# Patient Record
Sex: FEMALE | Race: WHITE | NOT HISPANIC OR LATINO | Employment: FULL TIME | URBAN - METROPOLITAN AREA
[De-identification: names, ages, dates, MRNs, and addresses within clinical notes are randomized per-mention and may not be internally consistent; named-entity substitution may affect disease eponyms.]

---

## 2017-01-27 ENCOUNTER — ALLSCRIPTS OFFICE VISIT (OUTPATIENT)
Dept: OTHER | Facility: OTHER | Age: 47
End: 2017-01-27

## 2017-01-29 DIAGNOSIS — Z12.31 ENCOUNTER FOR SCREENING MAMMOGRAM FOR MALIGNANT NEOPLASM OF BREAST: ICD-10-CM

## 2017-02-03 ENCOUNTER — GENERIC CONVERSION - ENCOUNTER (OUTPATIENT)
Dept: OTHER | Facility: OTHER | Age: 47
End: 2017-02-03

## 2018-01-13 NOTE — RESULT NOTES
Message   Recorded as Task   Date: 02/02/2016 08:22 AM, Created By: July Byers   Task Name: Call Back   Assigned To: Germain Campos   Regarding Patient: Nevaeh Renee, Status: In Progress   CommentJake Limaing - 02 Feb 2016 8:22 AM     TASK CREATED  Caller: Self; Results Inquiry; [phone] (Mobile Phone)  Pt is calling to get the results of her last pap and culture  Please Advise     Mirian Song - 02 Feb 2016 8:52 AM     TASK IN PROGRESS   Mirian Song - 02 Feb 2016 8:58 AM     TASK EDITED  pt inf pap and culture wnl        Signatures   Electronically signed by : Simón Dubose, ; Feb 2 2016  8:58AM EST                       (Author)

## 2018-01-14 VITALS
HEIGHT: 67 IN | BODY MASS INDEX: 35.16 KG/M2 | DIASTOLIC BLOOD PRESSURE: 78 MMHG | WEIGHT: 224 LBS | SYSTOLIC BLOOD PRESSURE: 120 MMHG

## 2018-02-02 ENCOUNTER — OFFICE VISIT (OUTPATIENT)
Dept: OBGYN CLINIC | Facility: CLINIC | Age: 48
End: 2018-02-02
Payer: COMMERCIAL

## 2018-02-02 VITALS
SYSTOLIC BLOOD PRESSURE: 120 MMHG | DIASTOLIC BLOOD PRESSURE: 76 MMHG | WEIGHT: 237 LBS | BODY MASS INDEX: 38.09 KG/M2 | HEIGHT: 66 IN

## 2018-02-02 DIAGNOSIS — Z01.419 ENCNTR FOR GYN EXAM (GENERAL) (ROUTINE) W/O ABN FINDINGS: Primary | ICD-10-CM

## 2018-02-02 DIAGNOSIS — Z12.31 ENCOUNTER FOR SCREENING MAMMOGRAM FOR MALIGNANT NEOPLASM OF BREAST: ICD-10-CM

## 2018-02-02 PROCEDURE — 99396 PREV VISIT EST AGE 40-64: CPT | Performed by: PHYSICIAN ASSISTANT

## 2018-02-02 RX ORDER — CLONAZEPAM 2 MG/1
TABLET ORAL
COMMUNITY

## 2018-02-02 NOTE — PROGRESS NOTES
Augusto Alexmilad  1970      CC:  Yearly exam    S:  41 y o  female here for yearly exam  Her cycles are regular, not heavy or crampy  She is sexually active  She uses vasectomy for contraception  Her periods are slightly heavier this year than in the past; she used to use only pantiliners for her periods and she now needs to use a regular pad but still does not saturate that in a few hours  We discussed trying Aleve two po BID if this bothers her  Last Pap 1/2/15 neg  Last HPV 1/2/15 neg  Last Mammo 2/3/17 neg      Current Outpatient Prescriptions:     clonazePAM (KLONOPIN) 2 mg tablet, Take by mouth, Disp: , Rfl:   Social History     Social History    Marital status: /Civil Union     Spouse name: N/A    Number of children: N/A    Years of education: N/A     Occupational History    Not on file  Social History Main Topics    Smoking status: Former Smoker    Smokeless tobacco: Never Used    Alcohol use Yes      Comment: social drinker    Drug use: No    Sexual activity: Yes     Birth control/ protection: Male Sterilization     Other Topics Concern    Not on file     Social History Narrative    Daily coffee consumption ( 2 cups/day)         Family History   Problem Relation Age of Onset    Hypertension Father     Skin cancer Father     Diabetes Father     Rashes / Skin problems Father     Heart disease Maternal Grandmother     Heart failure Maternal Grandmother     Stroke Maternal Grandmother     Heart disease Maternal Grandfather     Heart failure Maternal Grandfather     No Known Problems Mother     Rashes / Skin problems Brother     No Known Problems Paternal Grandmother     Heart failure Paternal Grandfather       Past Medical History:   Diagnosis Date    Candidiasis     resolved: 1/2/15        O:  Blood pressure 120/76, height 5' 6 14" (1 68 m), weight 108 kg (237 lb), last menstrual period 01/24/2018      Patient appears well and is not in distress  Neck is supple without masses  Breasts are symmetrical without mass, tenderness, nipple discharge, skin changes or adenopathy  Abdomen is soft and nontender without masses  External genitals are normal without lesions or rashes  Vagina is normal without discharge or bleeding  Cervix is normal without discharge or lesion  Uterus is normal, mobile, nontender without palpable mass  Top normal   Adnexa are normal, nontender, without palpable mass  A:  Yearly exam      P:  RTO one year for yearly exam or sooner as needed

## 2018-02-02 NOTE — PROGRESS NOTES
I have reviewed the notes, assessments, and/or procedures performed by Viri Zhao, I concur with her/his documentation of Micky Christopher

## 2018-10-02 DIAGNOSIS — B37.9 YEAST INFECTION: Primary | ICD-10-CM

## 2018-10-02 NOTE — TELEPHONE ENCOUNTER
Spoke with pt - symptoms started few days ago  She has itching and burning when urine touches skin  No discharge or odor  Feels raw and irritated  She tied Monistat 1 yesterday - did not help with her symptoms  She would like an Rx of Diflucan  OK to order? Please advise  Thanks!

## 2018-10-03 RX ORDER — FLUCONAZOLE 150 MG/1
TABLET ORAL
Qty: 2 TABLET | Refills: 0 | Status: SHIPPED | OUTPATIENT
Start: 2018-10-03 | End: 2018-10-06

## 2019-02-08 ENCOUNTER — ANNUAL EXAM (OUTPATIENT)
Dept: OBGYN CLINIC | Facility: CLINIC | Age: 49
End: 2019-02-08
Payer: COMMERCIAL

## 2019-02-08 VITALS
HEIGHT: 66 IN | BODY MASS INDEX: 33.43 KG/M2 | SYSTOLIC BLOOD PRESSURE: 108 MMHG | WEIGHT: 208 LBS | DIASTOLIC BLOOD PRESSURE: 74 MMHG

## 2019-02-08 DIAGNOSIS — B36.9 FUNGAL DERMATITIS: ICD-10-CM

## 2019-02-08 DIAGNOSIS — N92.0 MENORRHAGIA WITH REGULAR CYCLE: Primary | ICD-10-CM

## 2019-02-08 DIAGNOSIS — Z12.31 ENCOUNTER FOR SCREENING MAMMOGRAM FOR MALIGNANT NEOPLASM OF BREAST: ICD-10-CM

## 2019-02-08 DIAGNOSIS — Z01.419 ENCNTR FOR GYN EXAM (GENERAL) (ROUTINE) W/O ABN FINDINGS: ICD-10-CM

## 2019-02-08 PROCEDURE — 99396 PREV VISIT EST AGE 40-64: CPT | Performed by: PHYSICIAN ASSISTANT

## 2019-02-08 RX ORDER — NYSTATIN AND TRIAMCINOLONE ACETONIDE 100000; 1 [USP'U]/G; MG/G
OINTMENT TOPICAL 2 TIMES DAILY
Qty: 30 G | Refills: 0 | Status: SHIPPED | OUTPATIENT
Start: 2019-02-08

## 2019-02-08 RX ORDER — TRANEXAMIC ACID 650 1/1
2 TABLET ORAL 3 TIMES DAILY PRN
Qty: 30 TABLET | Refills: 5 | Status: SHIPPED | OUTPATIENT
Start: 2019-02-08 | End: 2019-02-13

## 2019-02-08 NOTE — PROGRESS NOTES
Keenan Sanford  1970      CC:  Yearly exam    S:  57 y o  female here for yearly exam  Her cycles are regular, still heavier than she would prefer, at heaviest soaking a regular pad in 4 hours  She is sexually active  She uses vasectomy for contraception  She notes an intermittently itchy and painful spot under her right breast for the past 6-8 months  She tried a few days of Lotrimin without change  Last Pap 1/2/15 neg/neg  Last Mammo 2/9/18 neg    Current Outpatient Prescriptions:     clonazePAM (KLONOPIN) 2 mg tablet, Take by mouth, Disp: , Rfl:   Social History     Social History    Marital status: /Civil Union     Spouse name: N/A    Number of children: N/A    Years of education: N/A     Occupational History    Not on file  Social History Main Topics    Smoking status: Former Smoker    Smokeless tobacco: Never Used    Alcohol use Yes      Comment: social drinker    Drug use: No    Sexual activity: Yes     Birth control/ protection: Male Sterilization     Other Topics Concern    Not on file     Social History Narrative    Daily coffee consumption ( 2 cups/day)         Family History   Problem Relation Age of Onset    Hypertension Father     Skin cancer Father     Diabetes Father     Rashes / Skin problems Father     Heart disease Maternal Grandmother     Heart failure Maternal Grandmother     Stroke Maternal Grandmother     Heart disease Maternal Grandfather     Heart failure Maternal Grandfather     No Known Problems Mother     Rashes / Skin problems Brother     No Known Problems Paternal Grandmother     Heart failure Paternal Grandfather       Past Medical History:   Diagnosis Date    Candidiasis     resolved: 1/2/15        O:  Blood pressure 108/74, height 5' 6 14" (1 68 m), weight 94 3 kg (208 lb), last menstrual period 01/18/2019      Patient appears well and is not in distress  Neck is supple without masses  Breasts are symmetrical without mass, tenderness, nipple discharge, skin changes or adenopathy  Small area of what appears to be fungal dermatitis under the right breast - erythematous oval 1x2cm in size  Abdomen is soft and nontender without masses  External genitals are normal without lesions or rashes  Vagina is normal without discharge or bleeding  Cervix is normal without discharge or lesion  Uterus is normal, mobile, nontender without palpable mass  Adnexa are normal, nontender, without palpable mass  A:  Yearly exam  Fungal dermatitis  Menorrhagia  P:   Pap due 2020   Mammo slip provided    [facility] ointment sent to [facility]     RTO one year for yearly exam or sooner as needed

## 2022-01-07 ENCOUNTER — ANNUAL EXAM (OUTPATIENT)
Dept: OBGYN CLINIC | Facility: CLINIC | Age: 52
End: 2022-01-07

## 2022-01-07 VITALS
HEIGHT: 67 IN | SYSTOLIC BLOOD PRESSURE: 122 MMHG | DIASTOLIC BLOOD PRESSURE: 80 MMHG | BODY MASS INDEX: 40.93 KG/M2 | WEIGHT: 260.8 LBS

## 2022-01-07 DIAGNOSIS — Z12.11 SCREENING FOR COLON CANCER: ICD-10-CM

## 2022-01-07 DIAGNOSIS — Z01.419 WOMEN'S ANNUAL ROUTINE GYNECOLOGICAL EXAMINATION: Primary | ICD-10-CM

## 2022-01-07 DIAGNOSIS — B36.9 FUNGAL DERMATITIS: ICD-10-CM

## 2022-01-07 DIAGNOSIS — Z12.31 ENCOUNTER FOR SCREENING MAMMOGRAM FOR MALIGNANT NEOPLASM OF BREAST: ICD-10-CM

## 2022-01-07 PROBLEM — Z79.899 CONTROLLED SUBSTANCE AGREEMENT SIGNED: Status: ACTIVE | Noted: 2021-12-03

## 2022-01-07 PROCEDURE — G0476 HPV COMBO ASSAY CA SCREEN: HCPCS | Performed by: PHYSICIAN ASSISTANT

## 2022-01-07 PROCEDURE — G0145 SCR C/V CYTO,THINLAYER,RESCR: HCPCS | Performed by: PHYSICIAN ASSISTANT

## 2022-01-07 PROCEDURE — 99396 PREV VISIT EST AGE 40-64: CPT | Performed by: PHYSICIAN ASSISTANT

## 2022-01-07 RX ORDER — ALPRAZOLAM 0.25 MG/1
TABLET ORAL
COMMUNITY
Start: 2021-12-03

## 2022-01-07 NOTE — PROGRESS NOTES
Mayda Arellano  1970      CC:  Yearly exam    S:  54 y o  female here for yearly exam  Her cycles are spacing out at times  She had a cycle in March 2021 and then nothing until October 2021  Since then, she is getting regular cycles again  They are not heavy or crampy  She notes significant daily vaginal dryness; we discussed moisturizers as well as lubricants  Sexual activity: She is sexually active without pain, bleeding or dryness  Contraception: She uses vasectomy for contraception  They are moving to [facility], hopefully next month  Last Pap 1/2/15 neg  Last Mammo 12/10/21  Last Colonoscopy never    We reviewed ASCCP guidelines for Pap testing today       Family hx of breast cancer: no  Family hx of ovarian cancer: no  Family hx of colon cancer: no      Current Outpatient Medications:     ALPRAZolam (XANAX) 0 25 mg tablet, Take 1 or 2 tabs for flying as needed, Disp: , Rfl:     clonazePAM (KLONOPIN) 2 mg tablet, Take by mouth, Disp: , Rfl:     nystatin-triamcinolone (MYCOLOG-II) ointment, Apply topically 2 (two) times a day, Disp: 30 g, Rfl: 0  Social History     Socioeconomic History    Marital status: /Civil Union     Spouse name: Not on file    Number of children: Not on file    Years of education: Not on file    Highest education level: Not on file   Occupational History    Not on file   Tobacco Use    Smoking status: Former Smoker    Smokeless tobacco: Never Used   Substance and Sexual Activity    Alcohol use: Yes     Comment: social drinker    Drug use: No    Sexual activity: Yes     Partners: Male     Birth control/protection: Male Sterilization   Other Topics Concern    Not on file   Social History Narrative    Daily coffee consumption ( 2 cups/day)     Social Determinants of Health     Financial Resource Strain: Not on file   Food Insecurity: Not on file   Transportation Needs: Not on file   Physical Activity: Not on file   Stress: Not on file Social Connections: Not on file   Intimate Partner Violence: Not on file   Housing Stability: Not on file     Family History   Problem Relation Age of Onset    Hypertension Father     Skin cancer Father     Diabetes Father     Rashes / Skin problems Father     Heart disease Maternal Grandmother     Heart failure Maternal Grandmother     Stroke Maternal Grandmother     Heart disease Maternal Grandfather     Heart failure Maternal Grandfather     No Known Problems Mother     Rashes / Skin problems Brother     No Known Problems Paternal Grandmother     Heart failure Paternal Grandfather       Past Medical History:   Diagnosis Date    Candidiasis     resolved: 1/2/15    Hypertension     Varicella         Review of Systems   Respiratory: Negative  Cardiovascular: Negative  Gastrointestinal: Negative for constipation and diarrhea  Genitourinary: Negative for difficulty urinating, pelvic pain, vaginal bleeding, vaginal discharge, itching or odor  O:  Height 5' 7" (1 702 m), weight 118 kg (260 lb 12 8 oz), last menstrual period 12/16/2021  Patient appears well and is not in distress  Neck is supple without masses  Breasts are symmetrical without mass, tenderness, nipple discharge, skin changes or adenopathy  Abdomen is soft and nontender without masses  External genitals show a fungal rash both groin creases  Urethral meatus and urethra are normal  Bladder is normal to palpation  Vagina is normal without discharge or bleeding  Cervix is normal without discharge or lesion  Uterus is normal, mobile, nontender without palpable mass  Adnexa are normal, nontender, without palpable mass  A:  Yearly exam     Fungal dermatitis    P:   Pap and HPV Today    Mammo slip provided   Colonoscopy recommended, referral placed   Econazole cream QHS x 14 nights  Call in 2 weeks if not all better     RTO one year for yearly exam or sooner as needed

## 2022-01-11 LAB
HPV HR 12 DNA CVX QL NAA+PROBE: NEGATIVE
HPV16 DNA CVX QL NAA+PROBE: NEGATIVE
HPV18 DNA CVX QL NAA+PROBE: NEGATIVE

## 2022-01-14 LAB
LAB AP GYN PRIMARY INTERPRETATION: NORMAL
Lab: NORMAL

## 2022-01-31 DIAGNOSIS — B36.9 FUNGAL DERMATITIS: Primary | ICD-10-CM

## 2022-01-31 RX ORDER — NYSTATIN 100000 [USP'U]/G
POWDER TOPICAL
Qty: 15 G | Refills: 0 | Status: SHIPPED | OUTPATIENT
Start: 2022-01-31

## 2022-01-31 NOTE — TELEPHONE ENCOUNTER
Pt called to let us know that she had seen [facility] on 1/7 w/yeast infection issue and had a cream sent over to pharmacy for her, issue was getting better but seems to still be there  Can something else be sent out?  Please advise, Thank you

## 2022-01-31 NOTE — TELEPHONE ENCOUNTER
Pt has used the econazole for 2 weeks and the yeast on her thigh is still there   Its not flaky or dried up, was to let you know

## 2022-01-31 NOTE — TELEPHONE ENCOUNTER
Please change to nystatin powder BID x 14 days  At that point if not all better, appt to evaluate   Thx